# Patient Record
Sex: FEMALE | Race: WHITE | NOT HISPANIC OR LATINO | Employment: UNEMPLOYED | URBAN - NONMETROPOLITAN AREA
[De-identification: names, ages, dates, MRNs, and addresses within clinical notes are randomized per-mention and may not be internally consistent; named-entity substitution may affect disease eponyms.]

---

## 2023-03-09 PROBLEM — N20.0 NEPHROLITHIASIS: Status: ACTIVE | Noted: 2023-03-09

## 2023-03-09 PROBLEM — E66.812 CLASS 2 OBESITY WITH ALVEOLAR HYPOVENTILATION AND BODY MASS INDEX (BMI) OF 37.0 TO 37.9 IN ADULT: Status: ACTIVE | Noted: 2023-03-09

## 2023-03-09 PROBLEM — R31.29 MICROSCOPIC HEMATURIA: Status: ACTIVE | Noted: 2023-03-09

## 2023-03-09 PROBLEM — J30.9 ALLERGIC RHINITIS: Status: ACTIVE | Noted: 2023-03-09

## 2023-03-09 PROBLEM — I10 BENIGN ESSENTIAL HYPERTENSION: Status: ACTIVE | Noted: 2023-03-09

## 2023-03-09 PROBLEM — E78.5 HYPERLIPIDEMIA: Status: ACTIVE | Noted: 2023-03-09

## 2023-03-09 PROBLEM — F32.A DEPRESSION: Status: ACTIVE | Noted: 2023-03-09

## 2023-03-09 PROBLEM — G47.9 SLEEP DISTURBANCE: Status: ACTIVE | Noted: 2023-03-09

## 2023-03-09 PROBLEM — K21.9 GERD (GASTROESOPHAGEAL REFLUX DISEASE): Status: ACTIVE | Noted: 2023-03-09

## 2023-03-09 PROBLEM — M25.561 CHRONIC PAIN OF RIGHT KNEE: Status: ACTIVE | Noted: 2023-03-09

## 2023-03-09 PROBLEM — R30.0 BURNING WITH URINATION: Status: ACTIVE | Noted: 2023-03-09

## 2023-03-09 PROBLEM — G89.29 CHRONIC PAIN OF RIGHT KNEE: Status: ACTIVE | Noted: 2023-03-09

## 2023-03-09 PROBLEM — M17.11 ARTHRITIS OF RIGHT KNEE: Status: ACTIVE | Noted: 2023-03-09

## 2023-03-09 PROBLEM — R35.0 FREQUENT URINATION: Status: ACTIVE | Noted: 2023-03-09

## 2023-03-09 PROBLEM — E66.2 CLASS 2 OBESITY WITH ALVEOLAR HYPOVENTILATION AND BODY MASS INDEX (BMI) OF 37.0 TO 37.9 IN ADULT (MULTI): Status: ACTIVE | Noted: 2023-03-09

## 2023-03-09 RX ORDER — AMLODIPINE BESYLATE 5 MG/1
1 TABLET ORAL DAILY
COMMUNITY
Start: 2022-04-11 | End: 2023-03-20 | Stop reason: ALTCHOICE

## 2023-03-09 RX ORDER — OMEPRAZOLE 20 MG/1
1 CAPSULE, DELAYED RELEASE ORAL DAILY
COMMUNITY

## 2023-03-09 RX ORDER — CETIRIZINE HYDROCHLORIDE 10 MG/1
10 TABLET ORAL DAILY
COMMUNITY

## 2023-03-09 RX ORDER — METOPROLOL SUCCINATE 100 MG/1
1 TABLET, EXTENDED RELEASE ORAL DAILY
COMMUNITY
Start: 2013-03-26 | End: 2023-03-10

## 2023-03-09 RX ORDER — SERTRALINE HYDROCHLORIDE 50 MG/1
50 TABLET, FILM COATED ORAL DAILY
COMMUNITY
Start: 2013-03-04 | End: 2023-03-20 | Stop reason: SDUPTHER

## 2023-03-09 RX ORDER — IRBESARTAN AND HYDROCHLOROTHIAZIDE 300; 12.5 MG/1; MG/1
1 TABLET, FILM COATED ORAL DAILY
COMMUNITY
Start: 2017-09-25 | End: 2023-03-20 | Stop reason: SDUPTHER

## 2023-03-10 DIAGNOSIS — E78.2 MIXED HYPERLIPIDEMIA: ICD-10-CM

## 2023-03-10 DIAGNOSIS — I10 BENIGN ESSENTIAL HYPERTENSION: Primary | ICD-10-CM

## 2023-03-10 DIAGNOSIS — F32.1 CURRENT MODERATE EPISODE OF MAJOR DEPRESSIVE DISORDER WITHOUT PRIOR EPISODE (MULTI): ICD-10-CM

## 2023-03-10 DIAGNOSIS — I10 ESSENTIAL (PRIMARY) HYPERTENSION: ICD-10-CM

## 2023-03-10 RX ORDER — METOPROLOL SUCCINATE 100 MG/1
TABLET, EXTENDED RELEASE ORAL
Qty: 14 TABLET | Refills: 0 | Status: SHIPPED | OUTPATIENT
Start: 2023-03-10 | End: 2023-03-20 | Stop reason: SDUPTHER

## 2023-03-20 ENCOUNTER — OFFICE VISIT (OUTPATIENT)
Dept: PRIMARY CARE | Facility: CLINIC | Age: 58
End: 2023-03-20
Payer: COMMERCIAL

## 2023-03-20 VITALS
DIASTOLIC BLOOD PRESSURE: 68 MMHG | SYSTOLIC BLOOD PRESSURE: 142 MMHG | WEIGHT: 217.7 LBS | HEIGHT: 64 IN | HEART RATE: 86 BPM | OXYGEN SATURATION: 95 % | BODY MASS INDEX: 37.17 KG/M2

## 2023-03-20 DIAGNOSIS — I10 ESSENTIAL (PRIMARY) HYPERTENSION: ICD-10-CM

## 2023-03-20 DIAGNOSIS — K76.0 FATTY LIVER: ICD-10-CM

## 2023-03-20 DIAGNOSIS — I10 BENIGN ESSENTIAL HYPERTENSION: ICD-10-CM

## 2023-03-20 DIAGNOSIS — E78.2 MIXED HYPERLIPIDEMIA: ICD-10-CM

## 2023-03-20 DIAGNOSIS — F32.1 CURRENT MODERATE EPISODE OF MAJOR DEPRESSIVE DISORDER WITHOUT PRIOR EPISODE (MULTI): ICD-10-CM

## 2023-03-20 DIAGNOSIS — J30.9 ALLERGIC RHINITIS, UNSPECIFIED SEASONALITY, UNSPECIFIED TRIGGER: ICD-10-CM

## 2023-03-20 DIAGNOSIS — K21.9 GASTROESOPHAGEAL REFLUX DISEASE WITHOUT ESOPHAGITIS: ICD-10-CM

## 2023-03-20 DIAGNOSIS — E66.2 CLASS 2 OBESITY WITH ALVEOLAR HYPOVENTILATION, SERIOUS COMORBIDITY, AND BODY MASS INDEX (BMI) OF 37.0 TO 37.9 IN ADULT (MULTI): Primary | ICD-10-CM

## 2023-03-20 PROBLEM — R30.0 BURNING WITH URINATION: Status: RESOLVED | Noted: 2023-03-09 | Resolved: 2023-03-20

## 2023-03-20 PROBLEM — R35.0 FREQUENT URINATION: Status: RESOLVED | Noted: 2023-03-09 | Resolved: 2023-03-20

## 2023-03-20 PROBLEM — N20.0 NEPHROLITHIASIS: Status: RESOLVED | Noted: 2023-03-09 | Resolved: 2023-03-20

## 2023-03-20 PROBLEM — G47.9 SLEEP DISTURBANCE: Status: RESOLVED | Noted: 2023-03-09 | Resolved: 2023-03-20

## 2023-03-20 LAB
ALANINE AMINOTRANSFERASE (SGPT) (U/L) IN SER/PLAS: 32 U/L (ref 7–45)
ALBUMIN (G/DL) IN SER/PLAS: 4.5 G/DL (ref 3.4–5)
ALKALINE PHOSPHATASE (U/L) IN SER/PLAS: 56 U/L (ref 33–110)
ANION GAP IN SER/PLAS: 12 MMOL/L (ref 10–20)
ASPARTATE AMINOTRANSFERASE (SGOT) (U/L) IN SER/PLAS: 19 U/L (ref 9–39)
BILIRUBIN TOTAL (MG/DL) IN SER/PLAS: 0.5 MG/DL (ref 0–1.2)
CALCIUM (MG/DL) IN SER/PLAS: 9.4 MG/DL (ref 8.6–10.3)
CARBON DIOXIDE, TOTAL (MMOL/L) IN SER/PLAS: 29 MMOL/L (ref 21–32)
CHLORIDE (MMOL/L) IN SER/PLAS: 101 MMOL/L (ref 98–107)
CHOLESTEROL (MG/DL) IN SER/PLAS: 190 MG/DL (ref 0–199)
CHOLESTEROL IN HDL (MG/DL) IN SER/PLAS: 42.9 MG/DL
CHOLESTEROL/HDL RATIO: 4.4
CREATININE (MG/DL) IN SER/PLAS: 0.84 MG/DL (ref 0.5–1.05)
GFR FEMALE: 82 ML/MIN/1.73M2
GLUCOSE (MG/DL) IN SER/PLAS: 96 MG/DL (ref 74–99)
LDL: 121 MG/DL (ref 0–99)
POTASSIUM (MMOL/L) IN SER/PLAS: 4.4 MMOL/L (ref 3.5–5.3)
PROTEIN TOTAL: 7.5 G/DL (ref 6.4–8.2)
SODIUM (MMOL/L) IN SER/PLAS: 138 MMOL/L (ref 136–145)
TRIGLYCERIDE (MG/DL) IN SER/PLAS: 133 MG/DL (ref 0–149)
UREA NITROGEN (MG/DL) IN SER/PLAS: 15 MG/DL (ref 6–23)
VLDL: 27 MG/DL (ref 0–40)

## 2023-03-20 PROCEDURE — 3077F SYST BP >= 140 MM HG: CPT | Performed by: FAMILY MEDICINE

## 2023-03-20 PROCEDURE — 80061 LIPID PANEL: CPT

## 2023-03-20 PROCEDURE — 36415 COLL VENOUS BLD VENIPUNCTURE: CPT | Performed by: FAMILY MEDICINE

## 2023-03-20 PROCEDURE — 80053 COMPREHEN METABOLIC PANEL: CPT

## 2023-03-20 PROCEDURE — 99214 OFFICE O/P EST MOD 30 MIN: CPT | Performed by: FAMILY MEDICINE

## 2023-03-20 PROCEDURE — 3078F DIAST BP <80 MM HG: CPT | Performed by: FAMILY MEDICINE

## 2023-03-20 PROCEDURE — 1036F TOBACCO NON-USER: CPT | Performed by: FAMILY MEDICINE

## 2023-03-20 PROCEDURE — 3008F BODY MASS INDEX DOCD: CPT | Performed by: FAMILY MEDICINE

## 2023-03-20 RX ORDER — METOPROLOL SUCCINATE 100 MG/1
100 TABLET, EXTENDED RELEASE ORAL DAILY
Qty: 90 TABLET | Refills: 3 | Status: SHIPPED | OUTPATIENT
Start: 2023-03-20 | End: 2024-03-18

## 2023-03-20 RX ORDER — SERTRALINE HYDROCHLORIDE 50 MG/1
50 TABLET, FILM COATED ORAL DAILY
Qty: 90 TABLET | Refills: 3 | Status: SHIPPED | OUTPATIENT
Start: 2023-03-20 | End: 2023-03-21

## 2023-03-20 RX ORDER — IRBESARTAN AND HYDROCHLOROTHIAZIDE 300; 12.5 MG/1; MG/1
1 TABLET, FILM COATED ORAL DAILY
Qty: 90 TABLET | Refills: 3 | Status: SHIPPED | OUTPATIENT
Start: 2023-03-20 | End: 2024-03-19

## 2023-03-20 ASSESSMENT — ENCOUNTER SYMPTOMS
COUGH: 0
ABDOMINAL DISTENTION: 0
RHINORRHEA: 0
FEVER: 0
CONSTIPATION: 0
ACTIVITY CHANGE: 0
CHEST TIGHTNESS: 0
PALPITATIONS: 0
FATIGUE: 0
DYSURIA: 0
ABDOMINAL PAIN: 0
APPETITE CHANGE: 0
DIFFICULTY URINATING: 0

## 2023-03-20 NOTE — PROGRESS NOTES
"Subjective   Patient ID: Ann Francis is a 57 y.o. female who presents for Med Refill.    HPI   Patient with history of hypertension, depression hyperlipidemia obesity    Review of Systems   Constitutional:  Negative for activity change, appetite change, fatigue and fever.   HENT:  Negative for congestion, postnasal drip and rhinorrhea.    Respiratory:  Negative for cough and chest tightness.    Cardiovascular:  Negative for chest pain and palpitations.   Gastrointestinal:  Negative for abdominal distention, abdominal pain and constipation.   Genitourinary:  Negative for difficulty urinating and dysuria.       Objective   /68   Pulse 86   Ht 1.613 m (5' 3.5\")   Wt 98.7 kg (217 lb 11.2 oz)   SpO2 95%   BMI 37.96 kg/m²     Physical Exam  General: alert, no apparent distress, good hygiene   HEAD:  Normocephalic, atraumatic    EARS:  EAC patent, TMs normal,   EYES:  sclera white, DHAVAL, conjunctiva noninjected  NOSE: Nasal passages patent   MOUTH: Pharynx clear, tongue uvula midline, grade 4 airway  Neck:  supple, no masses, thyroid non enlarged non nodular, no cervical adenopathy,  Lungs:  no wheezing, no rales , no rhonchi, normal respiratory pattern, breath sounds not diminished  Heart:  regular rate and  rhythm, no murmur, no ectopy, no S3 or S4, no carotid bruits  Abdomen:  soft NT,BS + ,  no organomegaly, no masses, no bruits  Extremities:  no edema, no cyanosis, no clubbing,  2+ posterior tibialis pulse    Psych:  speech fluent, normal affect, normal thought process  Skin:  no rashes, no concerning skin lesions, normal texture  Assessment/Plan   Problem List Items Addressed This Visit       Allergic rhinitis    Benign essential hypertension    Relevant Medications    irbesartan-hydrochlorothiazide (Avalide) 300-12.5 mg tablet    Other Relevant Orders    Comprehensive metabolic panel (Completed)    Class 2 obesity with alveolar hypoventilation and body mass index (BMI) of 37.0 to 37.9 in adult " (CMS/[facility]) - Primary    Relevant Orders    Lipid Panel (Completed)    Depression    Relevant Medications    sertraline (Zoloft) 50 mg tablet    GERD (gastroesophageal reflux disease)    Hyperlipidemia    Relevant Orders    Lipid Panel (Completed)     Other Visit Diagnoses       Fatty liver        Relevant Orders    Comprehensive metabolic panel (Completed)    Essential (primary) hypertension        Relevant Medications    metoprolol succinate XL (Toprol-XL) 100 mg 24 hr tablet        Patient monitors blood pressures at home that generally very well controlled per her 120s 130s.  Continue current medication  Discussed fatty liver with patient need to check LFTs if elevated need low-fat low-cholesterol diet.  Discussed fatty liver can destroy the liver if severe most of the time it does not need to monitor liver profile to have an idea if it is affecting liver.  Highly encourage low-fat low-cholesterol diet and weight loss which is currently the only treatment modality.  Did discuss some medications for weight loss weight watchers diet exercise.  Medications renewed lab work done  Follow-up no more than 1 year pending results of lab

## 2023-03-21 DIAGNOSIS — F32.1 CURRENT MODERATE EPISODE OF MAJOR DEPRESSIVE DISORDER WITHOUT PRIOR EPISODE (MULTI): ICD-10-CM

## 2023-03-21 RX ORDER — SERTRALINE HYDROCHLORIDE 50 MG/1
50 TABLET, FILM COATED ORAL DAILY
Qty: 90 TABLET | Refills: 3 | Status: SHIPPED | OUTPATIENT
Start: 2023-03-21 | End: 2023-03-26 | Stop reason: SDUPTHER

## 2023-03-21 RX ORDER — SERTRALINE HYDROCHLORIDE 50 MG/1
50 TABLET, FILM COATED ORAL DAILY
Qty: 90 TABLET | Refills: 3 | OUTPATIENT
Start: 2023-03-21 | End: 2024-03-20

## 2023-03-26 DIAGNOSIS — F32.1 CURRENT MODERATE EPISODE OF MAJOR DEPRESSIVE DISORDER WITHOUT PRIOR EPISODE (MULTI): ICD-10-CM

## 2023-03-26 RX ORDER — SERTRALINE HYDROCHLORIDE 50 MG/1
75 TABLET, FILM COATED ORAL DAILY
Qty: 135 TABLET | Refills: 3 | Status: SHIPPED | OUTPATIENT
Start: 2023-03-26 | End: 2024-05-03 | Stop reason: SDUPTHER

## 2024-03-17 DIAGNOSIS — I10 ESSENTIAL (PRIMARY) HYPERTENSION: ICD-10-CM

## 2024-03-18 RX ORDER — METOPROLOL SUCCINATE 100 MG/1
100 TABLET, EXTENDED RELEASE ORAL DAILY
Qty: 90 TABLET | Refills: 0 | Status: SHIPPED | OUTPATIENT
Start: 2024-03-18 | End: 2024-05-03 | Stop reason: SDUPTHER

## 2024-03-19 DIAGNOSIS — I10 BENIGN ESSENTIAL HYPERTENSION: ICD-10-CM

## 2024-03-19 RX ORDER — IRBESARTAN AND HYDROCHLOROTHIAZIDE 300; 12.5 MG/1; MG/1
1 TABLET, FILM COATED ORAL DAILY
Qty: 90 TABLET | Refills: 0 | Status: SHIPPED | OUTPATIENT
Start: 2024-03-19 | End: 2024-05-03 | Stop reason: SDUPTHER

## 2024-05-03 ENCOUNTER — OFFICE VISIT (OUTPATIENT)
Dept: PRIMARY CARE | Facility: CLINIC | Age: 59
End: 2024-05-03
Payer: COMMERCIAL

## 2024-05-03 VITALS
WEIGHT: 215 LBS | DIASTOLIC BLOOD PRESSURE: 70 MMHG | HEART RATE: 62 BPM | BODY MASS INDEX: 37.49 KG/M2 | OXYGEN SATURATION: 97 % | SYSTOLIC BLOOD PRESSURE: 122 MMHG

## 2024-05-03 DIAGNOSIS — Z12.31 VISIT FOR SCREENING MAMMOGRAM: Primary | ICD-10-CM

## 2024-05-03 DIAGNOSIS — I10 ESSENTIAL (PRIMARY) HYPERTENSION: ICD-10-CM

## 2024-05-03 DIAGNOSIS — I10 BENIGN ESSENTIAL HYPERTENSION: ICD-10-CM

## 2024-05-03 DIAGNOSIS — F32.1 CURRENT MODERATE EPISODE OF MAJOR DEPRESSIVE DISORDER WITHOUT PRIOR EPISODE (MULTI): ICD-10-CM

## 2024-05-03 DIAGNOSIS — Z12.31 SCREENING MAMMOGRAM, ENCOUNTER FOR: ICD-10-CM

## 2024-05-03 LAB
ALBUMIN SERPL BCP-MCNC: 4.4 G/DL (ref 3.4–5)
ALP SERPL-CCNC: 57 U/L (ref 33–110)
ALT SERPL W P-5'-P-CCNC: 32 U/L (ref 7–45)
ANION GAP SERPL CALC-SCNC: 12 MMOL/L (ref 10–20)
AST SERPL W P-5'-P-CCNC: 21 U/L (ref 9–39)
BASOPHILS # BLD AUTO: 0.06 X10*3/UL (ref 0–0.1)
BASOPHILS NFR BLD AUTO: 0.8 %
BILIRUB SERPL-MCNC: 0.5 MG/DL (ref 0–1.2)
BUN SERPL-MCNC: 13 MG/DL (ref 6–23)
CALCIUM SERPL-MCNC: 9.8 MG/DL (ref 8.6–10.3)
CHLORIDE SERPL-SCNC: 101 MMOL/L (ref 98–107)
CHOLEST SERPL-MCNC: 220 MG/DL (ref 0–199)
CHOLESTEROL/HDL RATIO: 5.2
CO2 SERPL-SCNC: 30 MMOL/L (ref 21–32)
CREAT SERPL-MCNC: 0.9 MG/DL (ref 0.5–1.05)
EGFRCR SERPLBLD CKD-EPI 2021: 75 ML/MIN/1.73M*2
EOSINOPHIL # BLD AUTO: 0.18 X10*3/UL (ref 0–0.7)
EOSINOPHIL NFR BLD AUTO: 2.5 %
ERYTHROCYTE [DISTWIDTH] IN BLOOD BY AUTOMATED COUNT: 12.8 % (ref 11.5–14.5)
GLUCOSE SERPL-MCNC: 79 MG/DL (ref 74–99)
HCT VFR BLD AUTO: 43 % (ref 36–46)
HDLC SERPL-MCNC: 42.2 MG/DL
HGB BLD-MCNC: 14.2 G/DL (ref 12–16)
IMM GRANULOCYTES # BLD AUTO: 0.01 X10*3/UL (ref 0–0.7)
IMM GRANULOCYTES NFR BLD AUTO: 0.1 % (ref 0–0.9)
LDLC SERPL CALC-MCNC: 137 MG/DL
LYMPHOCYTES # BLD AUTO: 3.36 X10*3/UL (ref 1.2–4.8)
LYMPHOCYTES NFR BLD AUTO: 46.5 %
MCH RBC QN AUTO: 30.8 PG (ref 26–34)
MCHC RBC AUTO-ENTMCNC: 33 G/DL (ref 32–36)
MCV RBC AUTO: 93 FL (ref 80–100)
MONOCYTES # BLD AUTO: 0.66 X10*3/UL (ref 0.1–1)
MONOCYTES NFR BLD AUTO: 9.1 %
NEUTROPHILS # BLD AUTO: 2.96 X10*3/UL (ref 1.2–7.7)
NEUTROPHILS NFR BLD AUTO: 41 %
NON HDL CHOLESTEROL: 178 MG/DL (ref 0–149)
NRBC BLD-RTO: 0 /100 WBCS (ref 0–0)
PLATELET # BLD AUTO: 322 X10*3/UL (ref 150–450)
POTASSIUM SERPL-SCNC: 4.4 MMOL/L (ref 3.5–5.3)
PROT SERPL-MCNC: 7.6 G/DL (ref 6.4–8.2)
RBC # BLD AUTO: 4.61 X10*6/UL (ref 4–5.2)
SODIUM SERPL-SCNC: 139 MMOL/L (ref 136–145)
TRIGL SERPL-MCNC: 203 MG/DL (ref 0–149)
TSH SERPL-ACNC: 1.78 MIU/L (ref 0.44–3.98)
VLDL: 41 MG/DL (ref 0–40)
WBC # BLD AUTO: 7.2 X10*3/UL (ref 4.4–11.3)

## 2024-05-03 PROCEDURE — 80053 COMPREHEN METABOLIC PANEL: CPT

## 2024-05-03 PROCEDURE — 1036F TOBACCO NON-USER: CPT | Performed by: FAMILY MEDICINE

## 2024-05-03 PROCEDURE — 3078F DIAST BP <80 MM HG: CPT | Performed by: FAMILY MEDICINE

## 2024-05-03 PROCEDURE — 99214 OFFICE O/P EST MOD 30 MIN: CPT | Performed by: FAMILY MEDICINE

## 2024-05-03 PROCEDURE — 80061 LIPID PANEL: CPT

## 2024-05-03 PROCEDURE — 84443 ASSAY THYROID STIM HORMONE: CPT

## 2024-05-03 PROCEDURE — 36415 COLL VENOUS BLD VENIPUNCTURE: CPT

## 2024-05-03 PROCEDURE — 85025 COMPLETE CBC W/AUTO DIFF WBC: CPT

## 2024-05-03 PROCEDURE — 3074F SYST BP LT 130 MM HG: CPT | Performed by: FAMILY MEDICINE

## 2024-05-03 RX ORDER — METOPROLOL SUCCINATE 100 MG/1
100 TABLET, EXTENDED RELEASE ORAL DAILY
Qty: 90 TABLET | Refills: 3 | Status: SHIPPED | OUTPATIENT
Start: 2024-05-03

## 2024-05-03 RX ORDER — IRBESARTAN AND HYDROCHLOROTHIAZIDE 300; 12.5 MG/1; MG/1
1 TABLET, FILM COATED ORAL DAILY
Qty: 90 TABLET | Refills: 3 | Status: SHIPPED | OUTPATIENT
Start: 2024-05-03

## 2024-05-03 RX ORDER — SERTRALINE HYDROCHLORIDE 50 MG/1
75 TABLET, FILM COATED ORAL DAILY
Qty: 135 TABLET | Refills: 3 | Status: SHIPPED | OUTPATIENT
Start: 2024-05-03 | End: 2025-05-03

## 2024-05-03 NOTE — PATIENT INSTRUCTIONS
Appt with DR Lee  -  [phone]       Call [phone] to set up mammogram       If you want me to do your well woman exams in the future  (pap is due in 2025) -  you tell the  you are setting up a well women exam       I will have your test results on your secureach card within a week.    If I don't, please call and leave me a message that they were not there.  I may have not seen them.       To call for your test results,  the secureach phone number on the card is    [phone]  You also need your Mailbox ID number and your Pin number which are on your card.   If you need assistance using the card or if you have lost it, call the [place] help number at   [phone]       Check with your insurance on the Shingrix vaccine-  see if they cover it and where they want you to get it.   If our office - you can make a nurse appt for it -  let me know and I will place the order after you have an appt.   If the pharmacy - then check with the pharmacy to see if you need an appointment.    Its two injections -  2 - 6 months apart.       Check on the Tetnas/pertussis vaccines too     Consider the COVID shot too -   That one has to be at the pharmacy       Hypertension Reminders:    IF YOU ARE A PERSON WHOSE BLOOD PRESSURE RUNS HIGH IN THE DOCTOR'S OFFICE,  THEN WE NEED TO VERIFY YOUR CUFF AT LEAST  ONCE YEARLY.   ALWAYS BRING CUFF WITH YOU TO ANY HYPERTENSION CHECK UP APPOINTMENT.  WE CAN RECORD YOUR BP  FROM HOME THE DAY OF THE APPOINTMENT - BUT WE HAVE TO SEE IT ON YOUR MACHINE.      To accurately check your blood pressure -  be sure to sit and relax for 5 minutes, you need your back supported, feet flat on the ground, arm heart level and relaxed.    Generally speaking, well controlled hypertension is below 130/80   for  most people  and if you are over 75, below 140/90  is acceptable.    Please take your medication as directed, and if you forget a dose  DO NOT DOUBLE THE DOSE THE NEXT DAY,  "just take is as you normally would.     It is important to stay on a low sodium diet :  1500 - 2000 mg of sodium a day  -  it is important to read labels.    Regular Exercise is very important as well.  Always gradually increase your exercise regimen.  Your goal is 30 minutes of a good cardiovascular exercise at least 5 days a week.    IF YOUR BMI (BODY MASS INDEX) IS OVER 25, LOSING WEIGHT WILL HELP CONTROL YOUR BLOOD PRESSURE.   Talk to me further if you need help doing this.        It is very important NOT to smoke  or use any tobacco products.  Talk to me about options if you want help quitting.    It is very important to keep your alcohol in take low.   Generally speaking, adult men should not drink more than 2 regular size beers a day, or no more than 2 ounces of liquor, or no more than 12 ounces of wine.  For adult women - the recommendations are half that.    BUT , THIS IS NOT UNIVERSAL   - be sure to ask me if alcohol is safe for you to drink, and if so, the acceptable amount.        For General Healthy Nutrition    (Remember - NOT A DIET!   Diets are only good for class reunions.)    These are my general good nutrition recommendations for most people.   I use the term \" diet \"  in these instructions to mean your overall nutrition - how you eat and drink.   If we talked about something different during your visit with me,  other than what is written below,  follow that advice instead.       For most people,  eating healthier means getting less added sugar and less processed foods in your diet    The fresher the better.    Added sugar is now a part of the nutrition label on manufactured food, so you can keep an eye on it easier.    But basically,  foods and beverages  that contain regular sugar and corn syrup are the main sources of added sugars.  Eating as little of these foods as you can is best.   One shocking example of the epidemic of added sugar is soda.    One can of regular soda contains about as " much added sugar as 3 regular size doughnuts!     The other issue with processed foods is the amount of processed grains they contain , such as white flour.    This is also something you want to try to limit in your diet.     But, grain products are very important for your nutrition.    Whole grains are better for your body.     Cutting back on white breads, traditional pasta, baked goods, white rice,  and processed cereals will be healthier for you.   The better choices include whole grain breads,  whole wheat pasta,  brown rice, quinoa, barley, steel cut  or rolled oats.   If you eat cereal for breakfast, try to look for one made with whole grains and less sugar.   There are many people who have a problem with gluten, for a large variety of reasons.    Generally,  products made with wheat flour , barley or rye are the primary source of gluten.       Cutting back on saturated fats is important.    You want the majority of the meat that you eat to be chicken, fish or turkey.   Baked or broiled is best -  fried adds too much fat.    There are healthy fats that are important - fat is important for holding down appetite, vitamin absorption and several metabolic processes in the body.  Monounsaturated fats raise HDL (good cholesterol) and lower LDL (bad cholesterol).   Olive oil, peanut oil, nuts, seeds, and avocados are great sources of the good fats.       Ideas are:   Trade sour cream dip for hummus (which is rich in olive oil) or guacamole; use veggies or whole-wheat chips to dip.    Nuts are an excellent source of protein and healthy fats.   Tree nuts are the best kind, such as almonds or walnuts.   Just be careful - they are high in calories, so stick to a serving size.  (Most are about 200 calories for a 1/4 cup)      Proteins are very important for your body, and they also hold down your appetite.   Try to have protein with every meal.    These generally are meats, nuts, many beans, legumes, eggs, and dairy.    "You will find protein in whole grain products and some green vegetables have a little too.     When you have dairy (if you can - many people are lactose intolerant) try to make it low fat.    Ideas are 1% milk, lowfat yogurt or cheeses, low fat cottage cheese.   I don't generally recommend FAT FREE because they often contain artificial products to improve taste, and the fat helps hold down your appetite.   If you are lactose intolerant, try to see if taking Lactaid before having dairy helps.      Fresh fruits and vegetables are VERY important.  The brighter the better.   Many vegetables are considered \"Free Foods\" - meaning you can eat as much as you want, and it does not matter.  These include tomatoes, cucumbers, celery, peppers, all the various lettuces and kale - to name a few.   Potatoes, corn and peas are starchy, so do have more calories, but are still healthy - you just want to watch the amount of them you eat.       Fruits are full of wonderful nutrition.   They contain natural sugar called fructose, so eating them in moderation is best.   Diabetics may need to pay careful attention to how their body reacts to the sugar.  Some fruits might drastically increase their blood sugar.      Eating smaller meals with a couple of small snacks is better for your metabolism than not eating for long amounts of time  (breakfast is very important).   Trying to avoid large meals is helpful too.    Eating like this helps keep your appetite down and keeps you in burning metabolism rather than storage metabolism so your body will use the calories you eat.       I do not tell people to stop eating sweets or snack foods - just limit the amounts you have.  The less the better.   Pay attention to serving sizes, and treat them as a treat.        Foods like doughnuts, pop tarts, sugar cereals, cookies  ARE NOT GOOD FOR BREAKFAST.   They are loaded with sugar and will cause you to be hungrier in the day and often not feel " well.    Caffeine needs to be limited - no more than 2 servings a day.  Some people can't have any at all.    (if you have any sleep or anxiety issues - stop the caffeine)   Coffee, many teas, many sodas, energy drinks, almost any diet supplement,  and chocolate all contain caffeine.      Water is important.   For most people, 8   x  8 ounces  a day are needed.  This may vary for some health issues.    If you need to be on a low sodium diet, that means looking at labels and eating only 1000 - 2000 mg of sodium a day.    Calcium intake is important.  3 servings of a high calcium food or drink a day is recommended.   This is usually a cup of milk, a cup of yogurt, an ounce of a hard cheese or 1.5 ounces of a soft cheese are the usual servings.   There are other high calcium foods - including soy or almond milk, broccoli,  almonds, dark green leafy vegetable.   Make sure you are not getting more than 1000  - 1200 mg of total calcium a day (unless you have been told you need more by a doctor).    Vitamin D 3 is important to absorb the calcium and for your immune system.   For children, 400 IU a day is recommended.   For adults - 800 - 5000 IU a day  is recommended.  (Often the amount needed is individualized for adults - be sure to ask how much is right for you)    Physical activity is very important for good health.    Finding activities that give you regular exercise is very important for good health.  Try to find exercise you enjoy doing on a regular basis.    30 minutes at least 5 days a week of a good cardiovascular exercise is recommended.   That means something that gets your heart rate going faster than your usual baseline and you can find yourself breathing harder than usual while you are exercising.  If you have not done any exercise in a long time,  make sure you ask if its safe for you to start,  and be sure to gradually work up to your goal.      If you need to lose weight,  following these recommendations  will help you.   And if you are doing all of this and still not losing weight, then its likely just the amount of food you are eating.   Learn to cut back on portion sizes.  Using smaller plates may help.  Healthy weight loss is  only about a pound a week.   You have to remember that whatever you do to lose the weight, you must be prepared to keep it up for life for the weight to stay off.     A lot of people have a lot of luck with using something like a fit bit,  or a program where you keep track of all of your calories that you eat and what you burn off in the day.

## 2024-05-03 NOTE — PROGRESS NOTES
Subjective   Patient ID: Ann Francis is a 58 y.o. female who presents for Establish Care (Former dr. Robin patient.  Follow up for refills.).    HPI     Former Sharda pt       Chronic issues reviewed today:     HTN -   Irbesartan-hydrochlorothiazide 300-12.5 daily   Metoprolol XL  100 mg daily  Takes meds daily     Hyperchol - not on meds   LDL  2023   121     Depression -   Sertraline  75 mg a day  -   Working well     GERD - omeprazole daily  -   Working well -  If misses it gets sx back     AR -   On zyrtec - works well       WWE -   Use to see Amish Calles   Last pap 4/2022  - WNL     Still has an IUD from 23 years   Amish recommended getting it removed   No period in few years -   But one day about a month ago - light pink spotting     Last mamm 2022 -   Will take and order   No period in     Cologuard  NEG  3/2022     Vaccines -   Flu - gets yearly   Pneumonia -  too young   COVID -  did not get one yet        Did have COVID in 2020   RSV  - too young   Tdap - childhood   Shingrix  - not yet       Mom is Samaria Meléndez      Review of Systems    Objective   /70 (BP Location: Right arm, Patient Position: Sitting, BP Cuff Size: Large adult)   Pulse 62   Wt 97.5 kg (215 lb)   SpO2 97%   BMI 37.49 kg/m²     Physical Exam  Vitals reviewed.   Constitutional:       General: She is not in acute distress.     Appearance: Normal appearance. She is obese.   HENT:      Head: Normocephalic and atraumatic.      Nose: Nose normal.      Mouth/Throat:      Mouth: Mucous membranes are moist.      Pharynx: No posterior oropharyngeal erythema.   Eyes:      Extraocular Movements: Extraocular movements intact.      Conjunctiva/sclera: Conjunctivae normal.      Pupils: Pupils are equal, round, and reactive to light.   Cardiovascular:      Rate and Rhythm: Normal rate and regular rhythm.      Heart sounds: Normal heart sounds. No murmur heard.  Pulmonary:      Effort: Pulmonary effort is normal. No respiratory distress.       Breath sounds: Normal breath sounds. No wheezing.   Musculoskeletal:      Cervical back: No rigidity.   Lymphadenopathy:      Cervical: No cervical adenopathy.   Skin:     General: Skin is warm and dry.      Findings: No rash.   Neurological:      General: No focal deficit present.      Mental Status: She is alert. Mental status is at baseline.   Psychiatric:         Mood and Affect: Mood normal.         Thought Content: Thought content normal.         Assessment/Plan   Problem List Items Addressed This Visit             ICD-10-CM       Medium    Benign essential hypertension I10    Relevant Medications    irbesartan-hydrochlorothiazide (Avalide) 300-12.5 mg tablet    Other Relevant Orders    Comprehensive Metabolic Panel    CBC and Auto Differential    Lipid Panel    TSH with reflex to Free T4 if abnormal    Depression F32.A    Relevant Medications    sertraline (Zoloft) 50 mg tablet     Other Visit Diagnoses         Codes    Visit for screening mammogram    -  Primary Z12.31    Screening mammogram, encounter for     Z12.31    Relevant Orders    BI mammo bilateral screening    Essential (primary) hypertension     I10    Relevant Medications    metoprolol succinate XL (Toprol-XL) 100 mg 24 hr tablet             Generally doing well with meds     I did give mammorder today     Labs today     Urged getting back into gyn to discuss IUD and the bleeding she had - she agreed to go     We discussed at visit any disease processes that were of concern as well as the risks, benefits and instructions of any new medication provided.    See orders and discussion section for information handed to patient on their Clinical Summary.   Patient (and/or caretaker of patient if present)  stated all questions were answered, and they voiced understanding of instructions.

## 2024-05-22 ENCOUNTER — HOSPITAL ENCOUNTER (OUTPATIENT)
Dept: RADIOLOGY | Facility: HOSPITAL | Age: 59
Discharge: HOME | End: 2024-05-22
Payer: COMMERCIAL

## 2024-05-22 VITALS — BODY MASS INDEX: 36.7 KG/M2 | HEIGHT: 64 IN | WEIGHT: 215 LBS

## 2024-05-22 DIAGNOSIS — Z12.31 SCREENING MAMMOGRAM, ENCOUNTER FOR: ICD-10-CM

## 2024-05-22 PROCEDURE — 77063 BREAST TOMOSYNTHESIS BI: CPT | Performed by: STUDENT IN AN ORGANIZED HEALTH CARE EDUCATION/TRAINING PROGRAM

## 2024-05-22 PROCEDURE — 77067 SCR MAMMO BI INCL CAD: CPT | Performed by: STUDENT IN AN ORGANIZED HEALTH CARE EDUCATION/TRAINING PROGRAM

## 2024-05-22 PROCEDURE — 77067 SCR MAMMO BI INCL CAD: CPT

## 2025-03-14 DIAGNOSIS — F32.1 CURRENT MODERATE EPISODE OF MAJOR DEPRESSIVE DISORDER WITHOUT PRIOR EPISODE (MULTI): ICD-10-CM

## 2025-03-14 RX ORDER — SERTRALINE HYDROCHLORIDE 50 MG/1
75 TABLET, FILM COATED ORAL DAILY
Qty: 135 TABLET | Refills: 0 | Status: SHIPPED | OUTPATIENT
Start: 2025-03-14 | End: 2025-06-12

## 2025-05-06 DIAGNOSIS — I10 BENIGN ESSENTIAL HYPERTENSION: ICD-10-CM

## 2025-05-06 RX ORDER — IRBESARTAN AND HYDROCHLOROTHIAZIDE 300; 12.5 MG/1; MG/1
1 TABLET, FILM COATED ORAL DAILY
Qty: 90 TABLET | Refills: 3 | Status: SHIPPED | OUTPATIENT
Start: 2025-05-06

## 2025-05-29 ENCOUNTER — APPOINTMENT (OUTPATIENT)
Dept: PRIMARY CARE | Facility: CLINIC | Age: 60
End: 2025-05-29
Payer: COMMERCIAL

## 2025-05-29 VITALS
BODY MASS INDEX: 38.71 KG/M2 | WEIGHT: 222 LBS | DIASTOLIC BLOOD PRESSURE: 68 MMHG | OXYGEN SATURATION: 96 % | SYSTOLIC BLOOD PRESSURE: 110 MMHG | HEART RATE: 78 BPM

## 2025-05-29 DIAGNOSIS — I10 ESSENTIAL (PRIMARY) HYPERTENSION: ICD-10-CM

## 2025-05-29 DIAGNOSIS — Z12.11 SCREEN FOR COLON CANCER: ICD-10-CM

## 2025-05-29 DIAGNOSIS — F34.1 PERSISTENT DEPRESSIVE DISORDER: ICD-10-CM

## 2025-05-29 DIAGNOSIS — Z12.39 ENCOUNTER FOR SCREENING BREAST EXAMINATION: ICD-10-CM

## 2025-05-29 DIAGNOSIS — I10 BENIGN ESSENTIAL HYPERTENSION: ICD-10-CM

## 2025-05-29 DIAGNOSIS — Z00.00 WELLNESS EXAMINATION: Primary | ICD-10-CM

## 2025-05-29 PROCEDURE — 99396 PREV VISIT EST AGE 40-64: CPT | Performed by: FAMILY MEDICINE

## 2025-05-29 PROCEDURE — 3078F DIAST BP <80 MM HG: CPT | Performed by: FAMILY MEDICINE

## 2025-05-29 PROCEDURE — 3074F SYST BP LT 130 MM HG: CPT | Performed by: FAMILY MEDICINE

## 2025-05-29 PROCEDURE — 1036F TOBACCO NON-USER: CPT | Performed by: FAMILY MEDICINE

## 2025-05-29 RX ORDER — METOPROLOL SUCCINATE 100 MG/1
100 TABLET, EXTENDED RELEASE ORAL DAILY
Qty: 90 TABLET | Refills: 3 | Status: SHIPPED | OUTPATIENT
Start: 2025-05-29

## 2025-05-29 RX ORDER — SERTRALINE HYDROCHLORIDE 50 MG/1
75 TABLET, FILM COATED ORAL DAILY
Qty: 135 TABLET | Refills: 3 | Status: SHIPPED | OUTPATIENT
Start: 2025-05-29 | End: 2026-05-24

## 2025-05-29 RX ORDER — IRBESARTAN AND HYDROCHLOROTHIAZIDE 300; 12.5 MG/1; MG/1
1 TABLET, FILM COATED ORAL DAILY
Qty: 90 TABLET | Refills: 3 | Status: SHIPPED | OUTPATIENT
Start: 2025-05-29

## 2025-05-29 ASSESSMENT — PATIENT HEALTH QUESTIONNAIRE - PHQ9
SUM OF ALL RESPONSES TO PHQ9 QUESTIONS 1 AND 2: 0
2. FEELING DOWN, DEPRESSED OR HOPELESS: NOT AT ALL
1. LITTLE INTEREST OR PLEASURE IN DOING THINGS: NOT AT ALL

## 2025-05-29 NOTE — ASSESSMENT & PLAN NOTE
Orders:    Comprehensive Metabolic Panel    CBC and Auto Differential    Lipid Panel    TSH with reflex to Free T4 if abnormal    irbesartan-hydrochlorothiazide (Avalide) 300-12.5 mg tablet; Take 1 tablet by mouth once daily.    No CHANGE IN MEDS

## 2025-05-29 NOTE — ASSESSMENT & PLAN NOTE
Orders:    sertraline (Zoloft) 50 mg tablet; Take 1.5 tablets (75 mg) by mouth once daily. Correct Rx    NO CHANGE

## 2025-05-29 NOTE — PROGRESS NOTES
"Chief Complaint   Patient presents with     Hypertension       Initial /79 (BP Location: Right arm, Patient Position: Chair, Cuff Size: Adult Regular)  Pulse 80  Temp 98.3  F (36.8  C) (Oral)  Ht 5' 1\" (1.549 m)  SpO2 97% Estimated body mass index is 25.13 kg/(m^2) as calculated from the following:    Height as of 8/4/17: 5' 1\" (1.549 m).    Weight as of 8/4/17: 133 lb (60.3 kg).  Medication Reconciliation: complete     Pa Adi Moulton MA      " Subjective   Patient ID: Ann Francis is a 59 y.o. female who presents for Annual Exam.    HPI     LOV 5/2024 -       Updates and Concerns:     Feeling heart palpitations for the past month -   Lying or sitting   Feels like a skipped beat  -   Can happen  -   Will go on about an hour   No CP or SOB , no near syncope    Has a lot of stress  Drinks 1/2 caf -  6 - 8 cups a day         Chronic issues reviewed today:     HTN -   Irbesartan-hydrochlorothiazide 300-12.5 daily   Metoprolol XL  100 mg daily  Takes meds daily     Hyperchol - not on meds   LDL  2024 - 137    Depression -   Sertraline  75 mg a day  -   Working well     GERD - omeprazole daily  -   Working well -  If misses it gets sx back     AR -   On zyrtec - works well       WWE -   Use to see Amish Calles   Last pap 4/2022  - WNL , HPV neg too     Did get IUD out in 2024  - DR Lee   No periods since     Last mamm  5/2024  - neg   Will take and order   BRCA - none in family     CRC  -   Cologuard  NEG  3/2022     DEXA - too young   CA/D     Vision - within the past year     Dentist -  keeps up to date       Vaccines -   Flu - gets yearly   Pneumonia -  too young   COVID -  did not get one yet        Did have COVID in 2020   RSV  - too young   Tdap - childhood   Shingrix  - not yet       ADV Dir -   done but not on chart       Mom is Samaria Meléndez      Review of Systems    Objective   /68 (BP Location: Right arm, Patient Position: Sitting, BP Cuff Size: Large adult)   Pulse 78   Wt 101 kg (222 lb)   SpO2 96%   BMI 38.71 kg/m²     Physical Exam  Vitals reviewed.   Constitutional:       General: She is not in acute distress.     Appearance: Normal appearance. She is obese.   HENT:      Head: Normocephalic and atraumatic.      Nose: Nose normal.      Mouth/Throat:      Mouth: Mucous membranes are moist.      Pharynx: No posterior oropharyngeal erythema.   Eyes:      Extraocular Movements: Extraocular movements intact.      Conjunctiva/sclera:  Conjunctivae normal.      Pupils: Pupils are equal, round, and reactive to light.   Cardiovascular:      Rate and Rhythm: Normal rate and regular rhythm.      Heart sounds: Normal heart sounds. No murmur heard.  Pulmonary:      Effort: Pulmonary effort is normal. No respiratory distress.      Breath sounds: Normal breath sounds. No wheezing.   Chest:      Chest wall: No mass or tenderness.   Breasts:     Sharif Score is 5.      Right: Normal. No mass, nipple discharge, skin change or tenderness.      Left: Normal. No mass, nipple discharge, skin change or tenderness.          Comments: Fibrocystic breasts   Areas marked thicker tissues   Musculoskeletal:      Cervical back: No rigidity.   Lymphadenopathy:      Cervical: No cervical adenopathy.      Upper Body:      Right upper body: No axillary adenopathy.      Left upper body: No axillary adenopathy.   Skin:     General: Skin is warm and dry.      Findings: No rash.   Neurological:      General: No focal deficit present.      Mental Status: She is alert. Mental status is at baseline.   Psychiatric:         Mood and Affect: Mood normal.         Thought Content: Thought content normal.         Assessment & Plan  Wellness examination    General wellness with breast exam   Screen for colon cancer    Orders:    Cologuard® colon cancer screening; Future    Encounter for screening breast examination    Orders:    BI mammo bilateral screening tomosynthesis; Future    Benign essential hypertension    Orders:    Comprehensive Metabolic Panel    CBC and Auto Differential    Lipid Panel    TSH with reflex to Free T4 if abnormal    irbesartan-hydrochlorothiazide (Avalide) 300-12.5 mg tablet; Take 1 tablet by mouth once daily.    No CHANGE IN MEDS   Essential (primary) hypertension    Orders:    metoprolol succinate XL (Toprol-XL) 100 mg 24 hr tablet; Take 1 tablet (100 mg) by mouth once daily.    Persistent depressive disorder    Orders:    sertraline (Zoloft) 50 mg tablet;  Take 1.5 tablets (75 mg) by mouth once daily. Correct Rx    NO CHANGE         Generally doing well with meds     Labs today     PALPITATIONS - SOUND LIKE PVCS -   TO CUT BACK ON CAFFEINE -   IF NOT BETTER - WILL ORDER HOLTER     We discussed at visit any disease processes that were of concern as well as the risks, benefits and instructions of any new medication provided.    See orders and discussion section for information handed to patient on their Clinical Summary.   Patient (and/or caretaker of patient if present)  stated all questions were answered, and they voiced understanding of instructions.

## 2025-05-29 NOTE — PATIENT INSTRUCTIONS
Try the low caffeine first -   If still with palpitations -  then let me know and we will order a holter monitor       WELL VISIT/PREVENTATIVE VISIT INSTRUCTIONS:        Call [phone] to schedule any testing ordered at [facility].      For a mammogram, call   [phone] .    Please work on healthy nutrition,  optimal sleep, and regular exercise to feel better.    If you smoke - please quit, and if you drink alcohol, please limit your intake.       Be sure to ask me about any vaccines you may be due for,  and ask me any questions you may have about vaccines.   Generally -  a flu shot is recommended every fall for everyone over 6 months of age,  a pneumonia shot Prevnar 20)  is recommended for anyone 50 and over or who has chronic conditions such as diabetes, heart or lung disease,  the shingles vaccines are recommended for people age 50 and over,   a Tetnas/Pertussis booster is very 10 years (after the childhood set);  the RSV vaccine is for people age 65 and over,  and the COVID vaccines are recommended for everyone, but the boosters are often particular people, so ask me if you qualify.      There may be more vaccines you qualify for depending on your medical conditions, so be sure to ask me about that if you have any chronic illnesses.    Some people have insurance that will pay for the vaccines at the pharmacy, and some your doctors office - so be sure to check with your insurance about the best place to get your vaccines and your coverage of them.     Generally speaking,  good nutrition consists of lean meats, like chicken, fish and turkey (not fried);    plenty of fruits and vegetables (the fresher the better);   Less sugars, saturated fats, and processed foods - especially those made with white flour.   Try to eat the majority of your grain foods  as whole grains.   Keep an eye on your total calories in a day and serving sizes on packaging - this will help you limit your overall intake.     "Remember, to lose weight (if you need to), your total calorie intake has to be about 300 - 500 calories less than what you burn in a day.   That number depends on your age, gender and body size.   Some people find a fitbit (calorie tracking device) helpful.      Please be sure to see the dentist every 6 months.    Please see the eye doctor no longer than every 2 years.    When you have your Living Will and Medical Power of  papers completed   (they have to be witnessed by 2 non-relatives or notarized to be legally binding),     please keep your originals in a safe place in your home, but make sure all your physicians have copies and that you take copies with you when you go to the hospital.        GETTING TEST RESULTS:    YOU WILL ALWAYS FIND OUT ABOUT ALL YOUR TEST RESULTS FROM ME.  I do not use the \"no news is good news\" policy.   The only time you would not, is if I have told you to get the testing done, and make a follow up appointment to go over it.    Then I will be waiting to talk to you at the visit about your test results.     I have a few different ways I get test results to you  (if its something urgent, we call you)  :       If you have a Secureach card -  I will have your test results on your secureach card within a week.  You should get a phone call telling you when the results are on the card, or just call the card in a week.   If two weeks go  by and you have not heard anything, call the card to see if the results are there,  and if not,  leave me a message.  If you are having trouble using [facility], they have a support line you should call :   [phone];   If you have lost your card, I need to know.     IT'S VERY IMPORTANT THAT YOU CALL TO LISTEN TO YOUR RESULTS, AS IT THEN LETS ME KNOW YOU GOT YOUR RESULTS.        If you get your test results on MY CHART,  you may commonly see your results even before I do.      I will always annotate a message on your results so you know that I " "saw them and how I feel about them.     If you need some help with MY CHART call the support number at [phone].    IF I mail your results to you,   I need to hear from you if you do not receive them within 2 weeks.      Be sure to let me know your preference for getting results.         BILLING FOR PREVENTATIVE VISITS.     Most insurance companies pay for a yearly \"Wellness Check\"  or they may call it a \"Preventative Physical\"   - but it's important to know what your plan covers ahead of the visit.    It's also a good idea to find out what sort of preventative testing they will cover for screening tests - like cholesterol, blood sugar, or colonoscopies. Also, find out which vaccines are covered and if you have any responsibility in paying for them.       If at your Wellness Visit,  we cover anything to do with problems/issues  you are having or  chronic medications/ medical conditions you have,   there will ALSO be a regular office charge that day.     Blood work  or testing obtained that has anything to do with an acute issue or chronic condition is billed under that diagnosis and not screening.       It's a good idea to find out from your insurance what is covered and what is your responsibility for all of the above possible charges.           Most importantly,   if you ever have any questions or concerns that cannot wait until your next visit with me,  you can message me through MY CHART or call the office and leave a voice mail message  (please allow for at least 24 hours for responses for these messages).       Take good care.   Dr Solis      For General Healthy Nutrition    (Remember - NOT A DIET!   Diets are only good for class reunions.)    These are my general good nutrition recommendations for most people.   I use the term \" diet \"  in these instructions to mean your overall nutrition - how you eat and drink.   If we talked about something different during your visit with me,  other than what is " written below,  follow that advice instead.       For most people,  eating healthier means getting less added sugar and less processed foods in your diet    The fresher the better.    Added sugar is now a part of the nutrition label on manufactured food, so you can keep an eye on it easier.    But basically,  foods and beverages  that contain regular sugar and corn syrup are the main sources of added sugars.  Eating as little of these foods as you can is best.   One shocking example of the epidemic of added sugar is soda.    One can of regular soda contains about as much added sugar as 3 regular size doughnuts!     The other issue with processed foods is the amount of processed grains they contain , such as white flour.    This is also something you want to try to limit in your diet.     But, grain products are very important for your nutrition.    Whole grains are better for your body.     Cutting back on white breads, traditional pasta, baked goods, white rice,  and processed cereals will be healthier for you.   The better choices include whole grain breads,  whole wheat pasta,  brown rice, quinoa, barley, steel cut  or rolled oats.   If you eat cereal for breakfast, try to look for one made with whole grains and less sugar.   There are many people who have a problem with gluten, for a large variety of reasons.    Generally,  products made with wheat flour , barley or rye are the primary source of gluten.       Cutting back on saturated fats is important.    You want the majority of the meat that you eat to be chicken, fish or turkey.   Baked or broiled is best -  fried adds too much fat.    There are healthy fats that are important - fat is important for holding down appetite, vitamin absorption and several metabolic processes in the body.  Monounsaturated fats raise HDL (good cholesterol) and lower LDL (bad cholesterol).   Olive oil, peanut oil, nuts, seeds, and avocados are great sources of the good fats.    "    Ideas are:   Trade sour cream dip for hummus (which is rich in olive oil) or guacamole; use veggies or whole-wheat chips to dip.    Nuts are an excellent source of protein and healthy fats.   Tree nuts are the best kind, such as almonds or walnuts.   Just be careful - they are high in calories, so stick to a serving size.  (Most are about 200 calories for a 1/4 cup)      Proteins are very important for your body, and they also hold down your appetite.   Try to have protein with every meal.    These generally are meats, nuts, many beans, legumes, eggs, and dairy.   You will find protein in whole grain products and some green vegetables have a little too.     When you have dairy (if you can - many people are lactose intolerant) try to make it low fat.    Ideas are 1% milk, lowfat yogurt or cheeses, low fat cottage cheese.   I don't generally recommend FAT FREE because they often contain artificial products to improve taste, and the fat helps hold down your appetite.   If you are lactose intolerant, try to see if taking Lactaid before having dairy helps.      Fresh fruits and vegetables are VERY important.  The brighter the better.   Many vegetables are considered \"Free Foods\" - meaning you can eat as much as you want, and it does not matter.  These include tomatoes, cucumbers, celery, peppers, all the various lettuces and kale - to name a few.   Potatoes, corn and peas are starchy, so do have more calories, but are still healthy - you just want to watch the amount of them you eat.       Fruits are full of wonderful nutrition.   They contain natural sugar called fructose, so eating them in moderation is best.   Diabetics may need to pay careful attention to how their body reacts to the sugar.  Some fruits might drastically increase their blood sugar.      Eating smaller meals with a couple of small snacks is better for your metabolism than not eating for long amounts of time  (breakfast is very important).   Trying " to avoid large meals is helpful too.    Eating like this helps keep your appetite down and keeps you in burning metabolism rather than storage metabolism so your body will use the calories you eat.       I do not tell people to stop eating sweets or snack foods - just limit the amounts you have.  The less the better.   Pay attention to serving sizes, and treat them as a treat.        Foods like doughnuts, pop tarts, sugar cereals, cookies  ARE NOT GOOD FOR BREAKFAST.   They are loaded with sugar and will cause you to be hungrier in the day and often not feel well.    Caffeine needs to be limited - no more than 2 servings a day.  Some people can't have any at all.    (if you have any sleep or anxiety issues - stop the caffeine)   Coffee, many teas, many sodas, energy drinks, almost any diet supplement,  and chocolate all contain caffeine.      Water is important.   For most people, 8   x  8 ounces  a day are needed.  This may vary for some health issues.    If you need to be on a low sodium diet, that means looking at labels and eating only 1000 - 2000 mg of sodium a day.    Calcium intake is important.  3 servings of a high calcium food or drink a day is recommended.   This is usually a cup of milk, a cup of yogurt, an ounce of a hard cheese or 1.5 ounces of a soft cheese are the usual servings.   There are other high calcium foods - including soy or almond milk, broccoli,  almonds, dark green leafy vegetable.   Make sure you are not getting more than 1000  - 1200 mg of total calcium a day (unless you have been told you need more by a doctor).    Vitamin D 3 is important to absorb the calcium and for your immune system.   For children, 400 IU a day is recommended.   For adults - 800 - 5000 IU a day  is recommended.  (Often the amount needed is individualized for adults - be sure to ask how much is right for you)    Physical activity is very important for good health.    Finding activities that give you regular  exercise is very important for good health.  Try to find exercise you enjoy doing on a regular basis.    30 minutes at least 5 days a week of a good cardiovascular exercise is recommended.   That means something that gets your heart rate going faster than your usual baseline and you can find yourself breathing harder than usual while you are exercising.  If you have not done any exercise in a long time,  make sure you ask if its safe for you to start,  and be sure to gradually work up to your goal.      If you need to lose weight,  following these recommendations will help you.   And if you are doing all of this and still not losing weight, then its likely just the amount of food you are eating.   Learn to cut back on portion sizes.  Using smaller plates may help.  Healthy weight loss is  only about a pound a week.   You have to remember that whatever you do to lose the weight, you must be prepared to keep it up for life for the weight to stay off.     A lot of people have a lot of luck with using something like a fit bit,  or a program where you keep track of all of your calories that you eat and what you burn off in the day.

## 2025-05-30 LAB
ALBUMIN SERPL-MCNC: 4.7 G/DL (ref 3.6–5.1)
ALP SERPL-CCNC: 54 U/L (ref 37–153)
ALT SERPL-CCNC: 33 U/L (ref 6–29)
ANION GAP SERPL CALCULATED.4IONS-SCNC: 10 MMOL/L (CALC) (ref 7–17)
AST SERPL-CCNC: 22 U/L (ref 10–35)
BASOPHILS # BLD AUTO: 90 CELLS/UL (ref 0–200)
BASOPHILS NFR BLD AUTO: 1.1 %
BILIRUB SERPL-MCNC: 0.4 MG/DL (ref 0.2–1.2)
BUN SERPL-MCNC: 16 MG/DL (ref 7–25)
CALCIUM SERPL-MCNC: 9.8 MG/DL (ref 8.6–10.4)
CHLORIDE SERPL-SCNC: 103 MMOL/L (ref 98–110)
CHOLEST SERPL-MCNC: 213 MG/DL
CHOLEST/HDLC SERPL: 4.8 (CALC)
CO2 SERPL-SCNC: 25 MMOL/L (ref 20–32)
CREAT SERPL-MCNC: 0.87 MG/DL (ref 0.5–1.03)
EGFRCR SERPLBLD CKD-EPI 2021: 78 ML/MIN/1.73M2
EOSINOPHIL # BLD AUTO: 148 CELLS/UL (ref 15–500)
EOSINOPHIL NFR BLD AUTO: 1.8 %
ERYTHROCYTE [DISTWIDTH] IN BLOOD BY AUTOMATED COUNT: 12.6 % (ref 11–15)
GLUCOSE SERPL-MCNC: 112 MG/DL (ref 65–99)
HCT VFR BLD AUTO: 45.2 % (ref 35–45)
HDLC SERPL-MCNC: 44 MG/DL
HGB BLD-MCNC: 14.8 G/DL (ref 11.7–15.5)
LDLC SERPL CALC-MCNC: 121 MG/DL (CALC)
LYMPHOCYTES # BLD AUTO: 3378 CELLS/UL (ref 850–3900)
LYMPHOCYTES NFR BLD AUTO: 41.2 %
MCH RBC QN AUTO: 31.5 PG (ref 27–33)
MCHC RBC AUTO-ENTMCNC: 32.7 G/DL (ref 32–36)
MCV RBC AUTO: 96.2 FL (ref 80–100)
MONOCYTES # BLD AUTO: 549 CELLS/UL (ref 200–950)
MONOCYTES NFR BLD AUTO: 6.7 %
NEUTROPHILS # BLD AUTO: 4034 CELLS/UL (ref 1500–7800)
NEUTROPHILS NFR BLD AUTO: 49.2 %
NONHDLC SERPL-MCNC: 169 MG/DL (CALC)
PLATELET # BLD AUTO: 361 THOUSAND/UL (ref 140–400)
PMV BLD REES-ECKER: 9.9 FL (ref 7.5–12.5)
POTASSIUM SERPL-SCNC: 3.9 MMOL/L (ref 3.5–5.3)
PROT SERPL-MCNC: 7.9 G/DL (ref 6.1–8.1)
RBC # BLD AUTO: 4.7 MILLION/UL (ref 3.8–5.1)
SODIUM SERPL-SCNC: 138 MMOL/L (ref 135–146)
TRIGL SERPL-MCNC: 334 MG/DL
TSH SERPL-ACNC: 1.6 MIU/L (ref 0.4–4.5)
WBC # BLD AUTO: 8.2 THOUSAND/UL (ref 3.8–10.8)

## 2025-06-13 LAB — NONINV COLON CA DNA+OCC BLD SCRN STL QL: NEGATIVE

## 2025-06-25 ENCOUNTER — HOSPITAL ENCOUNTER (OUTPATIENT)
Dept: RADIOLOGY | Facility: HOSPITAL | Age: 60
Discharge: HOME | End: 2025-06-25
Payer: COMMERCIAL

## 2025-06-25 VITALS — BODY MASS INDEX: 37.56 KG/M2 | WEIGHT: 220 LBS | HEIGHT: 64 IN

## 2025-06-25 DIAGNOSIS — Z12.39 ENCOUNTER FOR SCREENING BREAST EXAMINATION: ICD-10-CM

## 2025-06-25 PROCEDURE — 77067 SCR MAMMO BI INCL CAD: CPT | Performed by: STUDENT IN AN ORGANIZED HEALTH CARE EDUCATION/TRAINING PROGRAM

## 2025-06-25 PROCEDURE — 77063 BREAST TOMOSYNTHESIS BI: CPT | Performed by: STUDENT IN AN ORGANIZED HEALTH CARE EDUCATION/TRAINING PROGRAM

## 2025-06-25 PROCEDURE — 77067 SCR MAMMO BI INCL CAD: CPT

## 2025-07-10 ENCOUNTER — TELEPHONE (OUTPATIENT)
Dept: PRIMARY CARE | Facility: CLINIC | Age: 60
End: 2025-07-10
Payer: COMMERCIAL

## 2025-07-10 NOTE — TELEPHONE ENCOUNTER
I am retaining fluid in my feet and legs (probably due to the heat) but my one ankle is extremely painful and swollen, do you have any suggestions on what I should do?

## 2025-07-18 ENCOUNTER — APPOINTMENT (OUTPATIENT)
Dept: PRIMARY CARE | Facility: CLINIC | Age: 60
End: 2025-07-18
Payer: COMMERCIAL

## 2025-07-18 VITALS
WEIGHT: 224 LBS | DIASTOLIC BLOOD PRESSURE: 86 MMHG | SYSTOLIC BLOOD PRESSURE: 148 MMHG | OXYGEN SATURATION: 97 % | HEART RATE: 55 BPM | BODY MASS INDEX: 39.06 KG/M2

## 2025-07-18 DIAGNOSIS — R60.0 LEG EDEMA, LEFT: Primary | ICD-10-CM

## 2025-07-18 DIAGNOSIS — I10 BENIGN ESSENTIAL HYPERTENSION: ICD-10-CM

## 2025-07-18 PROCEDURE — 99213 OFFICE O/P EST LOW 20 MIN: CPT

## 2025-07-18 PROCEDURE — 3077F SYST BP >= 140 MM HG: CPT

## 2025-07-18 PROCEDURE — 3079F DIAST BP 80-89 MM HG: CPT

## 2025-07-18 RX ORDER — FUROSEMIDE 20 MG/1
20 TABLET ORAL DAILY
Qty: 30 TABLET | Refills: 0 | Status: SHIPPED | OUTPATIENT
Start: 2025-07-18 | End: 2025-08-17

## 2025-07-18 RX ORDER — POTASSIUM CHLORIDE 20 MEQ/1
20 TABLET, EXTENDED RELEASE ORAL DAILY
Qty: 30 TABLET | Refills: 0 | Status: SHIPPED | OUTPATIENT
Start: 2025-07-18 | End: 2025-08-17

## 2025-07-18 NOTE — ASSESSMENT & PLAN NOTE
"Discussed with Dr. Solis, the new thing that Ann has started is the \"Peak Performance\" supplement packet of several supplements, discussed that we do not know what is in it so this likely could be the cause of the swelling and also the blood pressure elevation as she just started this about a month ago   Recommend she stop the Peak Performance supplement   In the meantime, make sure she is watching her sodium intake   Also elevate her legs  Reviewed labs from 5/20/25, kidney function and liver function and electrolytes were wnl so recheck not felt needed at this time   Discussed also will do lasix and potassium prn, can take for the next several days and then can use prn -> emphasized she has to use potassium when taking lasix and to take the potassium with food     For blood pressure - recommend she again stop the Peak Performance supplement, also recommend she change the batteries in her cuff at home and come back here for a blood pressure check nurse visit for her cuff     Orders:    furosemide (Lasix) 20 mg tablet; Take 1 tablet (20 mg) by mouth once daily.    potassium chloride CR (Klor-Con M20) 20 mEq ER tablet; Take 1 tablet (20 mEq) by mouth once daily. Do not crush or chew.    "

## 2025-07-18 NOTE — PROGRESS NOTES
"Subjective   Patient ID: Ann Francis is a 59 y.o. female who presents for Leg Swelling (Swollen ankles and legs).  HPI  Ann presents for leg swelling in the L leg and L ankle   -Started once it got hot  -It got worse to the point where she could nto walk  -No falls, no twisting of her ankle  -R leg has been fine   -The L ankle hurts   -No redness, no heat, no lumps or bumps   -Mom did pass away so she was sitting a lot about a month ago but denies recent travel   -A year ago this happened but not as bad and it went away on its own   -Did start a new supplement, ordered a new packet of maintence vitamins called \"Peak Performance\"  -Feels like an ache in her ankle, at times it is a burning pain   -Swelling in the legs is better but L ankle is still swollen on the medial side     Elevated BP reading - 148/86 and 153/91  -Took medication this morning at 830/9a  -Last Friday did [facility] screening, BP was high there  -Checked BP at home and it was good, wondering if she needs to change the batteries in her cuff   -Denies dizziness, lightheadedness, SOB     Heart palpitations back when she saw Dr Solis   -Backed off caffeine and it got better       Surgical History[1]   Medical History[2]  Social History[3]     Review of Systems  10 point review of systems performed and is negative except as noted in the HPI.    Current Medications[4]     Objective   /86   Pulse 55   Wt 102 kg (224 lb)   SpO2 97%   BMI 39.06 kg/m²     Physical Exam  Constitutional:       Appearance: Normal appearance.   HENT:      Head: Normocephalic and atraumatic.     Cardiovascular:      Rate and Rhythm: Normal rate and regular rhythm.      Heart sounds: Normal heart sounds.   Pulmonary:      Effort: Pulmonary effort is normal.      Breath sounds: Normal breath sounds. No wheezing or rales.     Musculoskeletal:      Right lower leg: No edema.      Left lower leg: No tenderness. No edema.      Right ankle: No swelling.      Left ankle: " "Swelling (non-pitting, over lateral malleolus) present. No deformity or ecchymosis. No tenderness. Normal range of motion. Normal pulse.      Left Achilles Tendon: Normal. No tenderness.      Left foot: Normal. Normal range of motion. No swelling or tenderness. Normal pulse.      Comments: No erythema in L lower leg, no warmth, no rash. No palpable lumps or bumps.      Skin:     Findings: No erythema or rash.     Neurological:      Mental Status: She is alert and oriented to person, place, and time.         Assessment & Plan  Leg edema, left  Benign essential hypertension  Discussed with Dr. Solis, the new thing that Ann has started is the \"Peak Performance\" supplement packet of several supplements, discussed that we do not know what is in it so this likely could be the cause of the swelling and also the blood pressure elevation as she just started this about a month ago   Recommend she stop the Peak Performance supplement   In the meantime, make sure she is watching her sodium intake   Also elevate her legs  Reviewed labs from 5/20/25, kidney function and liver function and electrolytes were wnl so recheck not felt needed at this time   Discussed also will do lasix and potassium prn, can take for the next several days and then can use prn -> emphasized she has to use potassium when taking lasix and to take the potassium with food     For blood pressure - recommend she again stop the Peak Performance supplement, also recommend she change the batteries in her cuff at home and come back here for a blood pressure check nurse visit for her cuff     Orders:    furosemide (Lasix) 20 mg tablet; Take 1 tablet (20 mg) by mouth once daily.    potassium chloride CR (Klor-Con M20) 20 mEq ER tablet; Take 1 tablet (20 mEq) by mouth once daily. Do not crush or chew.          Discussed at visit any disease processes that were of concern as well as the risks, benefits and instructions on any new medication provided. Patient (and/or " caretaker of patient if present) stated all questions were answered, and they voiced understanding of instructions.      Shana Reyes PA-C       [1]   Past Surgical History:  Procedure Laterality Date    LITHOTRIPSY  06/26/2013    Renal Lithotripsy    OTHER SURGICAL HISTORY  06/26/2013    Lithotomy   [2]   Past Medical History:  Diagnosis Date    Body mass index (BMI) 37.0-37.9, adult 01/08/2020    Body mass index (BMI) of 37.0 to 37.9 in adult    Encounter for other preprocedural examination 10/09/2017    Pre-op testing    Nephrolithiasis 03/09/2023    Other conditions influencing health status     Nephrolithiasis    Personal history of other specified conditions 09/12/2017    History of dysuria   [3]   Social History  Tobacco Use    Smoking status: Former     Types: Cigarettes    Smokeless tobacco: Never   Vaping Use    Vaping status: Never Used   Substance Use Topics    Alcohol use: Never    Drug use: Never   [4]   Current Outpatient Medications:     cetirizine (ZyrTEC) 10 mg tablet, Take 1 tablet (10 mg) by mouth once daily., Disp: , Rfl:     irbesartan-hydrochlorothiazide (Avalide) 300-12.5 mg tablet, Take 1 tablet by mouth once daily., Disp: 90 tablet, Rfl: 3    metoprolol succinate XL (Toprol-XL) 100 mg 24 hr tablet, Take 1 tablet (100 mg) by mouth once daily., Disp: 90 tablet, Rfl: 3    omeprazole (PriLOSEC) 20 mg DR capsule, Take 1 capsule (20 mg) by mouth once daily., Disp: , Rfl:     sertraline (Zoloft) 50 mg tablet, Take 1.5 tablets (75 mg) by mouth once daily. Correct Rx, Disp: 135 tablet, Rfl: 3    furosemide (Lasix) 20 mg tablet, Take 1 tablet (20 mg) by mouth once daily., Disp: 30 tablet, Rfl: 0    potassium chloride CR (Klor-Con M20) 20 mEq ER tablet, Take 1 tablet (20 mEq) by mouth once daily. Do not crush or chew., Disp: 30 tablet, Rfl: 0

## 2025-07-18 NOTE — PATIENT INSTRUCTIONS
Start lasix for the fluid, when you take lasix you have to take potassium with it  For the potassium you want to take it with food     Watch your salt intake   Stop the supplement packet     Elevate your legs     Check blood pressure  Change cuff batteries   Set up nurse visit for blood pressure check

## 2025-08-01 ENCOUNTER — APPOINTMENT (OUTPATIENT)
Dept: PRIMARY CARE | Facility: CLINIC | Age: 60
End: 2025-08-01
Payer: COMMERCIAL

## 2025-08-01 DIAGNOSIS — I10 BENIGN ESSENTIAL HYPERTENSION: ICD-10-CM

## 2025-08-03 ENCOUNTER — TELEPHONE (OUTPATIENT)
Dept: PRIMARY CARE | Facility: CLINIC | Age: 60
End: 2025-08-03
Payer: COMMERCIAL

## 2025-08-04 NOTE — TELEPHONE ENCOUNTER
Please call pt -     She came in the other day for a BP cuff check -     Her's is good.     But she also dropped off life line screen.     Blood sugars are elevated  -   Liver enzyme is up  -   These  are all things we should talk about -   Along with her swelling and BP -     I would like to see her in within a few weeks to discuss - can be phone

## 2025-08-05 ENCOUNTER — APPOINTMENT (OUTPATIENT)
Dept: PRIMARY CARE | Facility: CLINIC | Age: 60
End: 2025-08-05
Payer: COMMERCIAL

## 2025-08-05 NOTE — PROGRESS NOTES
Subjective   Patient ID: Ann Francis is a 59 y.o. female who presents for No chief complaint on file..    HPI     Review of Systems    Objective   There were no vitals taken for this visit.    Physical Exam    Assessment/Plan   {Assess/PlanSmartLinks:39903}

## 2025-08-10 DIAGNOSIS — R60.0 LEG EDEMA, LEFT: ICD-10-CM

## 2025-08-11 RX ORDER — FUROSEMIDE 20 MG/1
20 TABLET ORAL DAILY
Qty: 90 TABLET | Refills: 1 | Status: SHIPPED | OUTPATIENT
Start: 2025-08-11

## 2025-08-11 RX ORDER — POTASSIUM CHLORIDE 20 MEQ/1
20 TABLET, EXTENDED RELEASE ORAL DAILY
Qty: 90 TABLET | Refills: 1 | Status: SHIPPED | OUTPATIENT
Start: 2025-08-11

## 2025-08-20 ENCOUNTER — APPOINTMENT (OUTPATIENT)
Dept: PRIMARY CARE | Facility: CLINIC | Age: 60
End: 2025-08-20
Payer: COMMERCIAL

## 2025-08-20 DIAGNOSIS — R73.03 PRE-DIABETES: ICD-10-CM

## 2025-08-20 DIAGNOSIS — I10 BENIGN ESSENTIAL HYPERTENSION: Primary | ICD-10-CM

## 2025-08-20 DIAGNOSIS — R60.0 LEG EDEMA, LEFT: ICD-10-CM

## 2025-08-20 PROBLEM — R74.8 ELEVATED LIVER ENZYMES: Status: ACTIVE | Noted: 2025-08-20

## 2025-08-20 PROCEDURE — 99212 OFFICE O/P EST SF 10 MIN: CPT | Performed by: FAMILY MEDICINE

## 2025-08-20 RX ORDER — FUROSEMIDE 20 MG/1
20 TABLET ORAL DAILY PRN
Start: 2025-08-20

## 2025-08-20 RX ORDER — POTASSIUM CHLORIDE 20 MEQ/1
20 TABLET, EXTENDED RELEASE ORAL DAILY PRN
Status: SHIPPED
Start: 2025-08-20

## 2025-12-15 ENCOUNTER — APPOINTMENT (OUTPATIENT)
Dept: PRIMARY CARE | Facility: CLINIC | Age: 60
End: 2025-12-15
Payer: COMMERCIAL